# Patient Record
Sex: FEMALE | Race: WHITE
[De-identification: names, ages, dates, MRNs, and addresses within clinical notes are randomized per-mention and may not be internally consistent; named-entity substitution may affect disease eponyms.]

---

## 2019-01-30 ENCOUNTER — HOSPITAL ENCOUNTER (EMERGENCY)
Age: 79
Discharge: HOME | End: 2019-01-30
Payer: MEDICARE

## 2019-01-30 VITALS — BODY MASS INDEX: 32.3 KG/M2

## 2019-01-30 VITALS
OXYGEN SATURATION: 98 % | SYSTOLIC BLOOD PRESSURE: 176 MMHG | HEART RATE: 61 BPM | DIASTOLIC BLOOD PRESSURE: 84 MMHG | RESPIRATION RATE: 11 BRPM

## 2019-01-30 VITALS
HEART RATE: 69 BPM | DIASTOLIC BLOOD PRESSURE: 71 MMHG | SYSTOLIC BLOOD PRESSURE: 174 MMHG | RESPIRATION RATE: 13 BRPM | OXYGEN SATURATION: 98 %

## 2019-01-30 VITALS
OXYGEN SATURATION: 95 % | SYSTOLIC BLOOD PRESSURE: 90 MMHG | HEART RATE: 85 BPM | RESPIRATION RATE: 30 BRPM | DIASTOLIC BLOOD PRESSURE: 73 MMHG

## 2019-01-30 VITALS
RESPIRATION RATE: 11 BRPM | HEART RATE: 63 BPM | SYSTOLIC BLOOD PRESSURE: 154 MMHG | DIASTOLIC BLOOD PRESSURE: 75 MMHG | OXYGEN SATURATION: 97 %

## 2019-01-30 VITALS
RESPIRATION RATE: 15 BRPM | DIASTOLIC BLOOD PRESSURE: 101 MMHG | SYSTOLIC BLOOD PRESSURE: 182 MMHG | HEART RATE: 71 BPM | TEMPERATURE: 99 F | OXYGEN SATURATION: 97 %

## 2019-01-30 VITALS — HEART RATE: 79 BPM | SYSTOLIC BLOOD PRESSURE: 122 MMHG | DIASTOLIC BLOOD PRESSURE: 81 MMHG

## 2019-01-30 DIAGNOSIS — R04.2: Primary | ICD-10-CM

## 2019-01-30 LAB
ADD MANUAL DIFF / SLIDE REVIEW: NO
ALBUMIN SERPL-MCNC: 4 G/DL (ref 3.5–5)
ALBUMIN/GLOB SERPL: 1.4 {RATIO} (ref 1–2.8)
ALP SERPL-CCNC: 74 U/L (ref 38–126)
ALT SERPL-CCNC: 38 IU/L (ref 9–52)
B TYPE NATRIURETIC PEPTIDE: < 100 (ref ?–100)
BUN SERPL-MCNC: 15 MG/DL (ref 7–17)
CALCIUM SERPL-MCNC: 8.5 MG/DL (ref 8.4–10.2)
CHLORIDE SERPL-SCNC: 106 MMOL/L (ref 98–107)
CK SERPL-CCNC: 123 U/L (ref 30–135)
CKMB % RELATIVE INDEX: 1.9 % (ref 1.5–5)
CO2 SERPL-SCNC: 25 MMOL/L (ref 22–32)
ESTIMATED GLOMERULAR FILT RATE: > 60 ML/MIN (ref 60–?)
GLOBULIN SER CALC-MCNC: 2.9 G/DL (ref 1.7–4.1)
GLUCOSE SERPL-MCNC: 98 MG/DL (ref 80–110)
HEMATOCRIT: 40.3 % (ref 36–46)
HEMOGLOBIN: 13.5 G/DL (ref 12–16)
HEMOLYSIS: < 15 (ref 0–50)
INR PPP: 0.9 (ref 0.9–1.3)
LYMPHOCYTES # SPEC AUTO: 1200 /UL (ref 1100–4500)
MCV RBC: 94.4 FL (ref 80–100)
MEAN CORPUSCULAR HEMOGLOBIN: 31.6 PG (ref 26–34)
MEAN CORPUSCULAR HGB CONC: 33.5 % (ref 30–36)
PLATELET COUNT: 222 X10^3/UL (ref 150–400)
POTASSIUM SERPL-SCNC: 4 MMOL/L (ref 3.4–5.1)
PROCALCITONIN: < 0.05 NG/ML (ref ?–0.5)
PROT SERPL-MCNC: 6.9 G/DL (ref 6.3–8.2)
PROTHROMBIN TIME: 10.9 SECONDS (ref 10.1–12.7)
PTT PARTIAL THROMBOPLASTIN TIM: 28 SECONDS (ref 26.4–36.2)
SODIUM SERPL-SCNC: 140 MMOL/L (ref 137–145)
TROPONIN I SERPL-MCNC: < 0.012 NG/ML (ref 0.01–0.03)

## 2019-01-30 PROCEDURE — 93005 ELECTROCARDIOGRAM TRACING: CPT

## 2019-01-30 PROCEDURE — 71275 CT ANGIOGRAPHY CHEST: CPT

## 2019-01-30 PROCEDURE — 93971 EXTREMITY STUDY: CPT

## 2019-01-30 PROCEDURE — 84484 ASSAY OF TROPONIN QUANT: CPT

## 2019-01-30 PROCEDURE — 85610 PROTHROMBIN TIME: CPT

## 2019-01-30 PROCEDURE — 36591 DRAW BLOOD OFF VENOUS DEVICE: CPT

## 2019-01-30 PROCEDURE — 85730 THROMBOPLASTIN TIME PARTIAL: CPT

## 2019-01-30 PROCEDURE — 99285 EMERGENCY DEPT VISIT HI MDM: CPT

## 2019-01-30 PROCEDURE — 80053 COMPREHEN METABOLIC PANEL: CPT

## 2019-01-30 PROCEDURE — 85025 COMPLETE CBC W/AUTO DIFF WBC: CPT

## 2019-01-30 PROCEDURE — 83880 ASSAY OF NATRIURETIC PEPTIDE: CPT

## 2019-01-30 PROCEDURE — 99283 EMERGENCY DEPT VISIT LOW MDM: CPT

## 2019-01-30 PROCEDURE — 84145 PROCALCITONIN (PCT): CPT

## 2019-01-30 PROCEDURE — 82550 ASSAY OF CK (CPK): CPT

## 2019-01-30 PROCEDURE — 82553 CREATINE MB FRACTION: CPT

## 2019-01-30 NOTE — ED.SOB
"HPI - SOB/Dyspnea
General
Chief Complaint: Shortness of Breath/Dyspnea
Stated Complaint: LT LEG SWOLLEN, SOB, SPITTING BLOOD
Time Seen by Provider: 01/30/19 10:55
Source: patient
Mode of arrival: ambulatory
Limitations: no limitations
History of Present Illness
Patient is a 70-year-old female presenting with increased shortness of breath. He has more shortness of breath with exertion.  Denies any orthopnea no fever or chills. She recently traveled from [place] and [facility] within the last month.  Well in 
[place] she had a left-sided calf cramp which was quite painful in she thought it was swollen.  Since then she has had progressing shortness of breath. She actually had an episode of hemoptysis last week about a handful of bright red blood.  She 
has not had any further episodes and.  He denies any chest pain or heart palpitations.
MD Complaint: shortness of breath
Severity: moderate
Consistency/Duration: constant
Relieving factors: rest
Exacerbating factors: exertion
Related Data
Home Medications

 Medication  Instructions  Recorded  Confirmed
levothyroxine [Synthroid] 137 mcg PO QAM #0 10/17/16 
losartan 50 mg PO QDAY #0 10/17/16 
aspirin 81 mg PO QDAY #0 03/28/17 

Previous Rx's

 Medication  Instructions  Recorded
omeprazole magnesium [Prilosec OTC] 20 mg PO SEE INSTRUCTIONS #30 10/17/16
azithromycin [Zithromax Z-Drake] 500 mg PO QDAY 5 Days #0 tab 03/31/17
ondansetron HCl [Zofran] 4 mg PO Q4HP PRN 7 Days #0 tab 03/31/17
polyethylene glycol 3350 [Miralax] 17 gm PO QDAY 30 Days #0 gm 03/31/17


Allergies

Allergy/AdvReac Type Severity Reaction Status Date / Time
No Known Drug Allergies Allergy   Verified 01/30/19 10:51



Review of Systems
Review of Systems
ROS Unobtainable: All systems reviewed & are unremarkable except as noted in HPI and below 
Constitutional
Denies chills, Denies fever(s), Denies lethargy and Denies weakness
ENT
Ears, Nose, Mouth, and Throat: Denies change in voice, Denies dizziness, Denies neck pain and Denies sore throat
Cardiovascular
Denies chest pain, Denies syncope, Denies irregular heart rhythm, Denies lightheadedness, Denies palpitations, Reports dyspnea on exertion and Denies orthopnea
Respiratory
Reports hemoptysis and Reports dyspnea on exertion
Gastrointestinal
Gastrointestinal: Denies abdominal pain, Denies change in bowel habits, Denies diarrhea, Denies nausea and Denies vomiting
Genitourinary
Denies hematuria, Denies flank pain, Denies urinary incontinence and Denies urinary urgency
Musculoskeletal
Denies neck pain
Integumentary/Breasts
Denies pruritus, Denies erythema, Denies rash and Denies wounds
Neurologic
Denies confusion, Denies dizziness, Denies syncope and Denies weakness
Psychiatric
Denies confusion
Endocrine
Denies palpitations
Hematologic/Lymphatic
Denies easy bleeding and Denies easy bruising

[facility]
Medical History

GERD (gastroesophageal reflux disease) (Acute)
Hypothyroid (Acute)


Social History (Reviewed 01/30/19 @ 11:13 by Sharron Jamil DO)
Smoking Status:  Never smoker



Social History (Reviewed 01/30/19 @ 11:13 by Sharron Jamil DO)
Smoking Status:  Never smoker




Exam
Initial Vital Signs
Initial Vital Signs:  Vital Signs

Temperature  99.0 F   01/30/19 10:51
Pulse Rate  71   01/30/19 10:51
Respiratory Rate  15   01/30/19 10:51
Blood Pressure  182/101 H  01/30/19 10:51
Pulse Oximetry  97   01/30/19 10:51

GENERAL:  Alert and oriented x3 pleasant female no acute distress
HEENT: Head atraumatic,EOMI, pupils reactive, face symmetric, 
CARDIOVASCULAR: Regular rate and rhythm without murmurs, rubs or gallops.
RESPIRATORY: Breath sounds equal bilaterally, no wheezes rales or rhonchi.  No respiratory distress speaks in full sentences
ABDOMEN: Soft, nontender.  Normoactive bowel sounds all 4 quadrants.  No guarding or rebound.

EXTREMITIES: Normal range of motion, no clubbing or edema.  Neurovascularly intact. Minimal left calf tenderness no significant swelling no eryth
[id number]|2019-01-30 16:09:53|2019-01-30 16:09:53|PC.NURSE||||"checked bp at home,  it was 3 digits, called dr aguilar, sent to ER.

## 2019-01-30 NOTE — ED_ITS
"HPI - SOB/Dyspnea    
General    
Chief Complaint: Shortness of Breath/Dyspnea    
Stated Complaint: LT LEG SWOLLEN, SOB, SPITTING BLOOD    
Time Seen by Provider: 01/30/19 10:55    
Source: patient    
Mode of arrival: ambulatory    
Limitations: no limitations    
History of Present Illness    
Patient is a 70-year-old female presenting with increased shortness of breath.   
He has more shortness of breath with exertion.  Denies any orthopnea no fever   
or chills. She recently traveled from [place] and [facility] within the last   
month.  Well in [place] she had a left-sided calf cramp which was quite   
painful in she thought it was swollen.  Since then she has had progressing   
shortness of breath. She actually had an episode of hemoptysis last week about   
a handful of bright red blood.  She has not had any further episodes and.  He   
denies any chest pain or heart palpitations.    
MD Complaint: shortness of breath    
Severity: moderate    
Consistency/Duration: constant    
Relieving factors: rest    
Exacerbating factors: exertion    
Related Data    
 Home Medications    
    
    
    
 Medication  Instructions  Recorded  Confirmed    
     
levothyroxine [Synthroid] 137 mcg PO QAM #0 10/17/16     
     
losartan 50 mg PO QDAY #0 10/17/16     
     
aspirin 81 mg PO QDAY #0 03/28/17     
    
    
 Previous Rx's    
    
    
    
 Medication  Instructions  Recorded    
     
omeprazole magnesium [Prilosec OTC] 20 mg PO SEE INSTRUCTIONS #30 10/17/16    
     
azithromycin [Zithromax Z-Drake] 500 mg PO QDAY 5 Days #0 tab 03/31/17    
     
ondansetron HCl [Zofran] 4 mg PO Q4HP PRN 7 Days #0 tab 03/31/17    
     
polyethylene glycol 3350 [Miralax] 17 gm PO QDAY 30 Days #0 gm 03/31/17    
    
    
    
 Allergies    
    
    
    
Allergy/AdvReac Type Severity Reaction Status Date / Time    
     
No Known Drug Allergies Allergy   Verified 01/30/19 10:51    
    
    
    
    
Review of Systems    
Review of Systems    
ROS Unobtainable: All systems reviewed & are unremarkable except as noted in   
HPI and below     
Constitutional    
Denies chills, Denies fever(s), Denies lethargy and Denies weakness    
ENT    
Ears, Nose, Mouth, and Throat: Denies change in voice, Denies dizziness, Denies   
neck pain and Denies sore throat    
Cardiovascular    
Denies chest pain, Denies syncope, Denies irregular heart rhythm, Denies   
lightheadedness, Denies palpitations, Reports dyspnea on exertion and Denies   
orthopnea    
Respiratory    
Reports hemoptysis and Reports dyspnea on exertion    
Gastrointestinal    
Gastrointestinal: Denies abdominal pain, Denies change in bowel habits, Denies   
diarrhea, Denies nausea and Denies vomiting    
Genitourinary    
Denies hematuria, Denies flank pain, Denies urinary incontinence and Denies   
urinary urgency    
Musculoskeletal    
Denies neck pain    
Integumentary/Breasts    
Denies pruritus, Denies erythema, Denies rash and Denies wounds    
Neurologic    
Denies confusion, Denies dizziness, Denies syncope and Denies weakness    
Psychiatric    
Denies confusion    
Endocrine    
Denies palpitations    
Hematologic/Lymphatic    
Denies easy bleeding and Denies easy bruising    
    
PFSH    
Medical History    
    
GERD (gastroesophageal reflux disease) (Acute)    
Hypothyroid (Acute)    
    
    
Social History (Reviewed 01/30/19 @ 11:13 by Sharron Jamil DO)    
Smoking Status:  Never smoker    
    
    
    
Social History (Reviewed 01/30/19 @ 11:13 by Sharron Jamil DO)    
Smoking Status:  Never smoker    
    
    
    
    
Exam    
Initial Vital Signs    
Initial Vital Signs:  Vital Signs    
    
    
    
Temperature  99.0 F   01/30/19 10:51    
     
Pulse Rate  71   01/30/19 10:51    
     
Respiratory Rate  15   01/30/19 10:51    
     
Blood Pressure  182/101 H  01/30/19 10:51    
     
Pulse Oximetry  97   01/30/19 10:51    
[id number]|2019-01-30 12:56:00|2019-01-30 13:33:00|DI.US.S_ITS|[facility]|Imaging|[id number]|"PROCEDURE:  US PERIPH VENOUS LOW EXTREM LT

## 2019-01-30 NOTE — DI.CT.S_ITS
PROCEDURE:  CT ANGIO CHEST PE PROTOCOL  
   
INDICATIONS:  Hemoptysis with shortness of breath  
   
TECHNIQUE:    
After the administration of intravenous contrast, 2 mm thick sections acquired from the   
pulmonary apices to the posterior costophrenic angles.  3-dimensional maximum intensity   
projection (MIP) coronal and sagittal reformats were then acquired through the thorax.    
For radiation dose reduction, the following was used:  automated exposure control,   
adjustment of mA and/or kV according to patient size.    
   
COMPARISON:  None.  
   
FINDINGS:    
Image quality:  Excellent.    
   
Pulmonary arteries:  Pulmonary arteries are normal in size, and demonstrate no   
intraluminal filling defects to suggest central pulmonary embolism.    
   
Lungs and pleura:  Lungs are clear.  No pleural effusions or pneumothorax.  Central and   
peripheral airways are patent.    
   
Mediastinum:  Heart size is normal, without pericardial effusion.  The Atherosclerotic   
calcifications are noted in the aorta, great vessels and the coronary vasculature.No   
mediastinal or hilar adenopathy.  Thoracic aorta is normal in caliber and enhancement.    
Esophagus is normal in caliber. Small hiatal hernia is noted.    
   
Bones and chest wall:  No suspicious bony lesions.  Ribs and thoracic spine appear intact   
throughout. Spine degenerative disc disease and facet arthropathy. No axillary or   
supraclavicular adenopathy.    
   
Abdomen:  Visualized upper abdominal solid organs appear normal in the early arterial   
phase of enhancement.    
   
IMPRESSION:  
   
1. No pulmonary embolus.  
   
2. No lung consolidation or pleural effusions.  
   
3. Atherosclerosis including the coronary vasculature.  
   
4. Small hiatal hernia.  
   
   
   
Dictated by: Sofia Almonet MD, PhD on 1/30/2019 at 12:09       
Approved by: Sofia Almonte MD, PhD on 1/30/2019 at 12:14

## 2020-01-22 ENCOUNTER — HOSPITAL ENCOUNTER (EMERGENCY)
Age: 80
Discharge: HOME | End: 2020-01-22
Payer: MEDICARE

## 2020-01-22 VITALS — HEART RATE: 99 BPM | RESPIRATION RATE: 17 BRPM | OXYGEN SATURATION: 94 %

## 2020-01-22 VITALS
OXYGEN SATURATION: 96 % | SYSTOLIC BLOOD PRESSURE: 154 MMHG | RESPIRATION RATE: 21 BRPM | HEART RATE: 109 BPM | DIASTOLIC BLOOD PRESSURE: 72 MMHG

## 2020-01-22 VITALS
RESPIRATION RATE: 16 BRPM | TEMPERATURE: 99.14 F | OXYGEN SATURATION: 95 % | DIASTOLIC BLOOD PRESSURE: 66 MMHG | SYSTOLIC BLOOD PRESSURE: 142 MMHG | HEART RATE: 105 BPM

## 2020-01-22 VITALS
HEART RATE: 111 BPM | SYSTOLIC BLOOD PRESSURE: 155 MMHG | TEMPERATURE: 98 F | RESPIRATION RATE: 22 BRPM | OXYGEN SATURATION: 96 % | DIASTOLIC BLOOD PRESSURE: 81 MMHG

## 2020-01-22 VITALS
HEART RATE: 104 BPM | SYSTOLIC BLOOD PRESSURE: 139 MMHG | RESPIRATION RATE: 19 BRPM | DIASTOLIC BLOOD PRESSURE: 57 MMHG | OXYGEN SATURATION: 97 %

## 2020-01-22 VITALS
SYSTOLIC BLOOD PRESSURE: 156 MMHG | DIASTOLIC BLOOD PRESSURE: 68 MMHG | RESPIRATION RATE: 24 BRPM | HEART RATE: 106 BPM | OXYGEN SATURATION: 97 %

## 2020-01-22 VITALS
RESPIRATION RATE: 28 BRPM | DIASTOLIC BLOOD PRESSURE: 68 MMHG | HEART RATE: 101 BPM | OXYGEN SATURATION: 97 % | SYSTOLIC BLOOD PRESSURE: 139 MMHG

## 2020-01-22 DIAGNOSIS — R00.0: ICD-10-CM

## 2020-01-22 DIAGNOSIS — J02.9: ICD-10-CM

## 2020-01-22 DIAGNOSIS — R53.83: ICD-10-CM

## 2020-01-22 DIAGNOSIS — R07.89: Primary | ICD-10-CM

## 2020-01-22 DIAGNOSIS — R06.00: ICD-10-CM

## 2020-01-22 DIAGNOSIS — D72.829: ICD-10-CM

## 2020-01-22 DIAGNOSIS — R05: ICD-10-CM

## 2020-01-22 LAB
ADD MANUAL DIFF / SLIDE REVIEW: NO
ALBUMIN SERPL-MCNC: 4.3 G/DL (ref 3.5–5)
ALBUMIN/GLOB SERPL: 1.2 {RATIO} (ref 1–2.8)
ALP SERPL-CCNC: 88 U/L (ref 38–126)
ALT SERPL-CCNC: 49 IU/L (ref ?–35)
BUN SERPL-MCNC: 19 MG/DL (ref 7–17)
CALCIUM SERPL-MCNC: 8.8 MG/DL (ref 8.4–10.2)
CHLORIDE SERPL-SCNC: 102 MMOL/L (ref 98–107)
CO2 SERPL-SCNC: 23 MMOL/L (ref 22–32)
ESTIMATED GLOMERULAR FILT RATE: > 60 ML/MIN (ref 60–?)
FLUAV RNA UPPER RESP QL NAA+PROBE: (no result)
FLUBV RNA UPPER RESP QL NAA+PROBE: (no result)
GLOBULIN SER CALC-MCNC: 3.5 G/DL (ref 1.7–4.1)
GLUCOSE SERPL-MCNC: 135 MG/DL (ref 80–110)
HEMATOCRIT: 39.5 % (ref 36–46)
HEMOGLOBIN: 13.6 G/DL (ref 12–16)
HEMOLYSIS: 101 (ref 0–50)
LACTATE SERPL-MCNC: 1.1 MMOL/L (ref 0.7–2.1)
LYMPHOCYTES # SPEC AUTO: 1100 /UL (ref 1100–4500)
MCV RBC: 93.4 FL (ref 80–100)
MEAN CORPUSCULAR HEMOGLOBIN: 32.2 PG (ref 26–34)
MEAN CORPUSCULAR HGB CONC: 34.5 % (ref 30–36)
PLATELET COUNT: 248 X10^3/UL (ref 150–400)
POTASSIUM SERPL-SCNC: 4.5 MMOL/L (ref 3.4–5.1)
PROCALCITONIN: < 0.05 NG/ML (ref ?–0.5)
PROT SERPL-MCNC: 7.8 G/DL (ref 6.3–8.2)
SODIUM SERPL-SCNC: 134 MMOL/L (ref 137–145)

## 2020-01-22 PROCEDURE — 83605 ASSAY OF LACTIC ACID: CPT

## 2020-01-22 PROCEDURE — 93010 ELECTROCARDIOGRAM REPORT: CPT

## 2020-01-22 PROCEDURE — 80053 COMPREHEN METABOLIC PANEL: CPT

## 2020-01-22 PROCEDURE — 36415 COLL VENOUS BLD VENIPUNCTURE: CPT

## 2020-01-22 PROCEDURE — 99285 EMERGENCY DEPT VISIT HI MDM: CPT

## 2020-01-22 PROCEDURE — 71046 X-RAY EXAM CHEST 2 VIEWS: CPT

## 2020-01-22 PROCEDURE — 94640 AIRWAY INHALATION TREATMENT: CPT

## 2020-01-22 PROCEDURE — 84145 PROCALCITONIN (PCT): CPT

## 2020-01-22 PROCEDURE — 87070 CULTURE OTHR SPECIMN AEROBIC: CPT

## 2020-01-22 PROCEDURE — 93005 ELECTROCARDIOGRAM TRACING: CPT

## 2020-01-22 PROCEDURE — 87040 BLOOD CULTURE FOR BACTERIA: CPT

## 2020-01-22 PROCEDURE — 87502 INFLUENZA DNA AMP PROBE: CPT

## 2020-01-22 PROCEDURE — 99284 EMERGENCY DEPT VISIT MOD MDM: CPT

## 2020-01-22 PROCEDURE — 85025 COMPLETE CBC W/AUTO DIFF WBC: CPT

## 2020-01-22 PROCEDURE — 87880 STREP A ASSAY W/OPTIC: CPT

## 2020-01-22 NOTE — DI.RAD.S_ITS
PROCEDURE:  XR CHEST 2V  
   
INDICATIONS:  soa/cough/fever  
   
TECHNIQUE:  2 views of the chest were acquired.    
   
COMPARISON:  [facility], , CHEST 2 VIEW, 3/28/2017, 16:44.  
   
FINDINGS:    
   
Surgical changes and devices:  None.    
   
Lungs and pleura:  Lungs are clear.  No pleural effusions or pneumothorax.    
   
Mediastinum:  Mediastinal contours are normal.  Heart size is normal.    
   
Bones and chest wall:  No suspicious bony abnormalities.  Soft tissues appear   
unremarkable.    
   
IMPRESSION:  Normal for age, source of current cough symptoms is not seen.  
   
   
   
Dictated by: Cory Clements M.D. on 1/22/2020 at 14:56       
Approved by: Cory Clements M.D. on 1/22/2020 at 14:56

## 2020-01-22 NOTE — ED_ITS
"HPI - SOB/Dyspnea    
General    
Chief Complaint: Shortness of Breath/Dyspnea    
Stated Complaint: breathing difficulty,ache,no energy    
Time Seen by Provider: 01/22/20 15:08    
Source: patient    
Mode of arrival: Ambulatory    
History of Present Illness    
HPI Narrative: The patient is a 79-year-old female who presented to the   
emergency department with shortness of breath and a congested rattling cough,   
sore throat, hoarse voice, upper respiratory nasal, sinus congestion with a mild  
sore throat.  She denied any headache but has had diffuse myalgias and joint   
aches.  She denies having any energy and has been extremely fatigued.  She   
denies any neck pain jaw pain shoulder arm pain.  However she has chest pain   
when she is coughing and points to her left costal sternal margin.  The patient   
states that she just returned home last night from [place] having spent the   
previous 9 days in [place].  She has been traveling for the last 36 hours.  She   
states that her pain and discomfort is a raw aching discomfort when she coughs   
that is 3/10 in intensity.  She has been sick for the last 5 days prior to   
admission.  She admits to having intermittent fever chills and sweats.  Her   
cough is been productive of a gray green sputum without hemoptysis.  She has had  
no wheezing palpitations or dizziness.  She denies any abdominal pain nausea   
vomiting diarrhea change in bowel habits melena or hematochezia.  She has had no  
urinary symptoms no frequency or urgency except for cough incontinence.    
Related Data    
                                Home Medications    
    
    
    
 Medication  Instructions  Recorded  Confirmed    
     
levothyroxine [Synthroid] 137 mcg PO QAM #0 10/17/16     
     
losartan 50 mg PO QDAY #0 10/17/16     
     
aspirin 81 mg PO QDAY #0 03/28/17     
    
    
                                  Previous Rx's    
    
    
    
 Medication  Instructions  Recorded    
     
omeprazole magnesium [Prilosec OTC] 20 mg PO SEE INSTRUCTIONS #30 10/17/16    
     
azithromycin [Zithromax Z-Drake] 500 mg PO QDAY 5 Days #0 tab 03/31/17    
     
ondansetron HCl [Zofran] 4 mg PO Q4HP PRN 7 Days #0 tab 03/31/17    
     
polyethylene glycol 3350 [Miralax] 17 gm PO QDAY 30 Days #0 gm 03/31/17    
     
albuterol sulfate 2 puff INHALATION 6XD #6.7 gram 01/22/20    
     
azithromycin [Zithromax] 250 mg PO DAILY 4 Days  tab 01/22/20    
     
prednisone 40 mg PO DAILY #10 tab 01/22/20    
    
    
    
                                    Allergies    
    
    
    
Allergy/AdvReac Type Severity Reaction Status Date / Time    
     
No Known Drug Allergies Allergy   Verified 01/30/19 10:51    
    
    
    
    
Review of Systems    
Review of Systems    
Narrative: All review of systems were negative except for those mentioned in the  
 history of present illness.    
    
Patient History    
Medical History (Updated 01/22/20 @ 15:23 by Heladio Gordon MD)    
    
GERD (gastroesophageal reflux disease) (Acute)    
Hypothyroid (Acute)    
    
    
Social History (Reviewed 01/22/20 @ 23:06 by Heladio Gordon MD)    
Smoking Status:  Never smoker     
    
    
Smoking Status: Never smoker    
Substance Use Type: does not use    
    
Exam    
Narrative    
Exam Narrative: PHYSICAL EXAM:    
CONSTITUTIONAL: Awake, Alert, Oriented, Coherent, Cooperative in mild distress/   
uncomfortable Does not appear toxic or ill.  Has a hoarse raspy voice.    
HEAD: AT/NC    
EENT: PERRL, FROM of eyes, no discharge, no nystagmus    
   No epistaxis or nasal drainage    
   Oral mucosa is moist and pink, posterior pharynx is without erythema or   
exudate.    
NECK: Supple, no obvious JVD, Trachea is midline without stridor, no palpable LN  
 or masses.    
SPINE: No gross deformity, no palpable tenderness of the cervical, thoracic,   
lumbar or sacral spine.    
   No CVA tenderness.    
THORAX: No deform
[id number]|2020-01-22 15:21:12|2020-01-22 15:21:12|ED.SOB||||"HPI - SOB/Dyspnea

## 2023-07-24 ENCOUNTER — HOSPITAL ENCOUNTER (EMERGENCY)
Age: 83
Discharge: HOME | End: 2023-07-24
Payer: MEDICARE

## 2023-07-24 VITALS — OXYGEN SATURATION: 97 % | HEART RATE: 79 BPM

## 2023-07-24 VITALS — OXYGEN SATURATION: 97 % | SYSTOLIC BLOOD PRESSURE: 134 MMHG | DIASTOLIC BLOOD PRESSURE: 60 MMHG | HEART RATE: 81 BPM

## 2023-07-24 VITALS — HEART RATE: 86 BPM | OXYGEN SATURATION: 95 %

## 2023-07-24 VITALS
RESPIRATION RATE: 16 BRPM | DIASTOLIC BLOOD PRESSURE: 65 MMHG | HEART RATE: 92 BPM | TEMPERATURE: 97.1 F | OXYGEN SATURATION: 97 % | SYSTOLIC BLOOD PRESSURE: 141 MMHG

## 2023-07-24 VITALS — OXYGEN SATURATION: 98 % | SYSTOLIC BLOOD PRESSURE: 141 MMHG | HEART RATE: 77 BPM | DIASTOLIC BLOOD PRESSURE: 60 MMHG

## 2023-07-24 VITALS — BODY MASS INDEX: 36.6 KG/M2

## 2023-07-24 VITALS — OXYGEN SATURATION: 95 % | HEART RATE: 78 BPM | DIASTOLIC BLOOD PRESSURE: 60 MMHG | SYSTOLIC BLOOD PRESSURE: 138 MMHG
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
## 2023-07-24 DIAGNOSIS — S09.90XA: ICD-10-CM

## 2023-07-24 DIAGNOSIS — N39.0: Primary | ICD-10-CM

## 2023-07-24 DIAGNOSIS — R53.1: ICD-10-CM

## 2023-07-24 LAB
ADD MANUAL DIFF / SLIDE REVIEW: NO
ALBUMIN SERPL-MCNC: 4 G/DL (ref 3.5–5)
ALBUMIN/GLOB SERPL: 1.1 {RATIO} (ref 1–2.8)
ALP SERPL-CCNC: 107 U/L (ref 38–126)
ALT SERPL-CCNC: 41 IU/L (ref ?–35)
BUN SERPL-MCNC: 18 MG/DL (ref 7–17)
CALCIUM SERPL-MCNC: 8.6 MG/DL (ref 8.4–10.2)
CHLORIDE SERPL-SCNC: 104 MMOL/L (ref 98–107)
CO2 SERPL-SCNC: 24 MMOL/L (ref 22–32)
CULTURE INDICATED URINE: (no result)
ESTIMATED GLOMERULAR FILT RATE: 53 ML/MIN (ref 60–?)
GLOBULIN SER CALC-MCNC: 3.7 G/DL (ref 1.7–4.1)
GLUCOSE SERPL-MCNC: 159 MG/DL (ref 80–110)
HEMATOCRIT: 39 % (ref 36–46)
HEMOGLOBIN: 13.4 G/DL (ref 12–16)
HEMOLYSIS: < 15 (ref 0–50)
LACTATE SERPL-MCNC: 0.9 MMOL/L (ref 0.7–2.1)
LYMPHOCYTES # SPEC AUTO: 900 /UL (ref 1100–4500)
MCV RBC: 93 FL (ref 80–100)
MEAN CORPUSCULAR HEMOGLOBIN: 32 PG (ref 26–34)
MEAN CORPUSCULAR HGB CONC: 34.4 % (ref 30–36)
PLATELET COUNT: 219 X10^3/UL (ref 150–400)
POTASSIUM SERPL-SCNC: 4.5 MMOL/L (ref 3.4–5.1)
PROCALCITONIN SERPL-MCNC: 0.13 NG/ML (ref ?–0.5)
PROT SERPL-MCNC: 7.7 G/DL (ref 6.3–8.2)
SODIUM SERPL-SCNC: 134 MMOL/L (ref 137–145)

## 2023-07-24 PROCEDURE — 84145 PROCALCITONIN (PCT): CPT

## 2023-07-24 PROCEDURE — 87186 SC STD MICRODIL/AGAR DIL: CPT

## 2023-07-24 PROCEDURE — 87086 URINE CULTURE/COLONY COUNT: CPT

## 2023-07-24 PROCEDURE — 70450 CT HEAD/BRAIN W/O DYE: CPT

## 2023-07-24 PROCEDURE — 85025 COMPLETE CBC W/AUTO DIFF WBC: CPT

## 2023-07-24 PROCEDURE — 36415 COLL VENOUS BLD VENIPUNCTURE: CPT

## 2023-07-24 PROCEDURE — 96365 THER/PROPH/DIAG IV INF INIT: CPT

## 2023-07-24 PROCEDURE — 99284 EMERGENCY DEPT VISIT MOD MDM: CPT

## 2023-07-24 PROCEDURE — 87077 CULTURE AEROBIC IDENTIFY: CPT

## 2023-07-24 PROCEDURE — 80053 COMPREHEN METABOLIC PANEL: CPT

## 2023-07-24 PROCEDURE — 83605 ASSAY OF LACTIC ACID: CPT

## 2023-07-24 PROCEDURE — 81003 URINALYSIS AUTO W/O SCOPE: CPT

## 2023-07-24 PROCEDURE — 71045 X-RAY EXAM CHEST 1 VIEW: CPT

## 2023-07-24 PROCEDURE — 81015 MICROSCOPIC EXAM OF URINE: CPT

## 2025-07-16 ENCOUNTER — HOSPITAL ENCOUNTER (OUTPATIENT)
Age: 85
End: 2025-07-16
Payer: MEDICARE

## 2025-07-16 DIAGNOSIS — R07.9: ICD-10-CM

## 2025-07-16 DIAGNOSIS — R06.09: Primary | ICD-10-CM

## 2025-07-16 DIAGNOSIS — Z87.891: ICD-10-CM

## 2025-07-16 DIAGNOSIS — R94.2: ICD-10-CM

## 2025-07-16 PROCEDURE — 94060 EVALUATION OF WHEEZING: CPT

## 2025-07-16 PROCEDURE — 94070 EVALUATION OF WHEEZING: CPT

## 2025-07-16 PROCEDURE — 94618 PULMONARY STRESS TESTING: CPT

## 2025-07-16 PROCEDURE — 94729 DIFFUSING CAPACITY: CPT

## 2025-07-16 PROCEDURE — 94726 PLETHYSMOGRAPHY LUNG VOLUMES: CPT
